# Patient Record
Sex: MALE | Race: OTHER | HISPANIC OR LATINO | URBAN - METROPOLITAN AREA
[De-identification: names, ages, dates, MRNs, and addresses within clinical notes are randomized per-mention and may not be internally consistent; named-entity substitution may affect disease eponyms.]

---

## 2017-06-26 ENCOUNTER — APPOINTMENT (RX ONLY)
Dept: URBAN - METROPOLITAN AREA CLINIC 349 | Facility: CLINIC | Age: 32
Setting detail: DERMATOLOGY
End: 2017-06-26

## 2017-06-26 DIAGNOSIS — L663 OTHER SPECIFIED DISEASES OF HAIR AND HAIR FOLLICLES: ICD-10-CM

## 2017-06-26 DIAGNOSIS — L73.9 FOLLICULAR DISORDER, UNSPECIFIED: ICD-10-CM

## 2017-06-26 DIAGNOSIS — L738 OTHER SPECIFIED DISEASES OF HAIR AND HAIR FOLLICLES: ICD-10-CM

## 2017-06-26 PROBLEM — L02.821 FURUNCLE OF HEAD [ANY PART, EXCEPT FACE]: Status: ACTIVE | Noted: 2017-06-26

## 2017-06-26 PROCEDURE — ? COUNSELING

## 2017-06-26 PROCEDURE — ? PRESCRIPTION

## 2017-06-26 PROCEDURE — 99213 OFFICE O/P EST LOW 20 MIN: CPT

## 2017-06-26 RX ORDER — CLINDAMYCIN PHOSPHATE 10 MG/ML
SOLUTION TOPICAL
Qty: 1 | Refills: 5 | Status: ERX | COMMUNITY
Start: 2017-06-26

## 2017-06-26 RX ORDER — SULFAMETHOXAZOLE AND TRIMETHOPRIM 800; 160 MG/1; MG/1
TABLET ORAL
Qty: 60 | Refills: 3 | Status: ERX | COMMUNITY
Start: 2017-06-26

## 2017-06-26 RX ADMIN — CLINDAMYCIN PHOSPHATE: 10 SOLUTION TOPICAL at 00:00

## 2017-06-26 RX ADMIN — SULFAMETHOXAZOLE AND TRIMETHOPRIM: 800; 160 TABLET ORAL at 00:00

## 2017-06-26 ASSESSMENT — LOCATION SIMPLE DESCRIPTION DERM
LOCATION SIMPLE: POSTERIOR SCALP
LOCATION SIMPLE: SCALP

## 2017-06-26 ASSESSMENT — LOCATION DETAILED DESCRIPTION DERM
LOCATION DETAILED: MID-OCCIPITAL SCALP
LOCATION DETAILED: LEFT SUPERIOR PARIETAL SCALP
LOCATION DETAILED: RIGHT INFERIOR OCCIPITAL SCALP
LOCATION DETAILED: RIGHT SUPERIOR PARIETAL SCALP

## 2017-06-26 ASSESSMENT — LOCATION ZONE DERM: LOCATION ZONE: SCALP

## 2017-10-03 ENCOUNTER — RX ONLY (OUTPATIENT)
Age: 32
Setting detail: RX ONLY
End: 2017-10-03

## 2017-10-03 RX ORDER — CLINDAMYCIN PHOSPHATE 10 MG/ML
SOLUTION TOPICAL
Qty: 1 | Refills: 5 | Status: ERX

## 2017-10-03 RX ORDER — SULFAMETHOXAZOLE AND TRIMETHOPRIM 800; 160 MG/1; MG/1
TABLET ORAL
Qty: 60 | Refills: 1 | Status: ERX

## 2017-11-10 ENCOUNTER — APPOINTMENT (RX ONLY)
Dept: URBAN - METROPOLITAN AREA CLINIC 349 | Facility: CLINIC | Age: 32
Setting detail: DERMATOLOGY
End: 2017-11-10

## 2017-11-10 DIAGNOSIS — L73.9 FOLLICULAR DISORDER, UNSPECIFIED: ICD-10-CM | Status: RESOLVING

## 2017-11-10 DIAGNOSIS — L738 OTHER SPECIFIED DISEASES OF HAIR AND HAIR FOLLICLES: ICD-10-CM | Status: RESOLVING

## 2017-11-10 DIAGNOSIS — L663 OTHER SPECIFIED DISEASES OF HAIR AND HAIR FOLLICLES: ICD-10-CM | Status: RESOLVING

## 2017-11-10 PROBLEM — L02.821 FURUNCLE OF HEAD [ANY PART, EXCEPT FACE]: Status: ACTIVE | Noted: 2017-11-10

## 2017-11-10 PROCEDURE — ? PRESCRIPTION

## 2017-11-10 PROCEDURE — ? TREATMENT REGIMEN

## 2017-11-10 PROCEDURE — ? COUNSELING

## 2017-11-10 PROCEDURE — 99213 OFFICE O/P EST LOW 20 MIN: CPT

## 2017-11-10 RX ORDER — SULFAMETHOXAZOLE AND TRIMETHOPRIM 800; 160 MG/1; MG/1
TABLET ORAL
Qty: 60 | Refills: 1 | Status: CANCELLED
Stop reason: CLARIF

## 2017-11-10 RX ORDER — SULFAMETHOXAZOLE AND TRIMETHOPRIM 800; 160 MG/1; MG/1
TABLET ORAL
Qty: 60 | Refills: 3 | Status: CANCELLED
Stop reason: CLARIF

## 2017-11-10 RX ORDER — CLINDAMYCIN PHOSPHATE 10 MG/ML
SOLUTION TOPICAL
Qty: 1 | Refills: 5 | Status: CANCELLED
Stop reason: CLARIF

## 2017-11-10 ASSESSMENT — LOCATION ZONE DERM: LOCATION ZONE: SCALP

## 2017-11-10 ASSESSMENT — LOCATION SIMPLE DESCRIPTION DERM
LOCATION SIMPLE: SCALP
LOCATION SIMPLE: POSTERIOR SCALP

## 2017-11-10 ASSESSMENT — LOCATION DETAILED DESCRIPTION DERM
LOCATION DETAILED: LEFT SUPERIOR PARIETAL SCALP
LOCATION DETAILED: MID-OCCIPITAL SCALP
LOCATION DETAILED: RIGHT SUPERIOR PARIETAL SCALP
LOCATION DETAILED: RIGHT INFERIOR OCCIPITAL SCALP

## 2018-05-24 ENCOUNTER — RX ONLY (OUTPATIENT)
Age: 33
Setting detail: RX ONLY
End: 2018-05-24

## 2018-05-24 ENCOUNTER — APPOINTMENT (RX ONLY)
Dept: URBAN - METROPOLITAN AREA CLINIC 349 | Facility: CLINIC | Age: 33
Setting detail: DERMATOLOGY
End: 2018-05-24

## 2018-05-24 DIAGNOSIS — L73.9 FOLLICULAR DISORDER, UNSPECIFIED: ICD-10-CM

## 2018-05-24 DIAGNOSIS — L738 OTHER SPECIFIED DISEASES OF HAIR AND HAIR FOLLICLES: ICD-10-CM

## 2018-05-24 DIAGNOSIS — L663 OTHER SPECIFIED DISEASES OF HAIR AND HAIR FOLLICLES: ICD-10-CM

## 2018-05-24 PROBLEM — L02.821 FURUNCLE OF HEAD [ANY PART, EXCEPT FACE]: Status: ACTIVE | Noted: 2018-05-24

## 2018-05-24 PROCEDURE — ? TREATMENT REGIMEN

## 2018-05-24 PROCEDURE — 99213 OFFICE O/P EST LOW 20 MIN: CPT

## 2018-05-24 PROCEDURE — ? COUNSELING

## 2018-05-24 PROCEDURE — ? PRESCRIPTION

## 2018-05-24 RX ORDER — CLOBETASOL PROPIONATE 0.46 MG/ML
SOLUTION TOPICAL
Qty: 1 | Refills: 2 | Status: ERX

## 2018-05-24 RX ORDER — CIPROFLOXACIN HYDROCHLORIDE 500 MG/1
TABLET, FILM COATED ORAL
Qty: 60 | Refills: 2 | Status: ERX | COMMUNITY
Start: 2018-05-24

## 2018-05-24 RX ORDER — CLOBETASOL PROPIONATE 0.46 MG/ML
SOLUTION TOPICAL
Qty: 1 | Refills: 2 | Status: ERX | COMMUNITY
Start: 2018-05-24

## 2018-05-24 RX ADMIN — CLOBETASOL PROPIONATE: 0.46 SOLUTION TOPICAL at 00:00

## 2018-05-24 RX ADMIN — CIPROFLOXACIN HYDROCHLORIDE: 500 TABLET, FILM COATED ORAL at 00:00

## 2018-05-24 ASSESSMENT — LOCATION DETAILED DESCRIPTION DERM
LOCATION DETAILED: LEFT SUPERIOR PARIETAL SCALP
LOCATION DETAILED: MID-OCCIPITAL SCALP
LOCATION DETAILED: LEFT SUPERIOR PARIETAL SCALP
LOCATION DETAILED: RIGHT INFERIOR OCCIPITAL SCALP
LOCATION DETAILED: RIGHT SUPERIOR PARIETAL SCALP

## 2018-05-24 ASSESSMENT — LOCATION SIMPLE DESCRIPTION DERM
LOCATION SIMPLE: SCALP
LOCATION SIMPLE: POSTERIOR SCALP
LOCATION SIMPLE: SCALP

## 2018-05-24 ASSESSMENT — LOCATION ZONE DERM
LOCATION ZONE: SCALP
LOCATION ZONE: SCALP

## 2018-05-24 NOTE — HPI: INFECTION (FOLLICULITIS)
How Severe Is It?: severe
Is This A New Presentation, Or A Follow-Up?: Follow Up Folliculitis
Additional History: Recurring

## 2018-06-25 ENCOUNTER — RX ONLY (OUTPATIENT)
Age: 33
Setting detail: RX ONLY
End: 2018-06-25

## 2018-06-25 RX ORDER — SULFAMETHOXAZOLE AND TRIMETHOPRIM 800; 160 MG/1; MG/1
TABLET ORAL
Qty: 60 | Refills: 2 | Status: ERX

## 2019-02-12 ENCOUNTER — APPOINTMENT (RX ONLY)
Dept: URBAN - METROPOLITAN AREA CLINIC 349 | Facility: CLINIC | Age: 34
Setting detail: DERMATOLOGY
End: 2019-02-12

## 2019-02-12 DIAGNOSIS — Z41.9 ENCOUNTER FOR PROCEDURE FOR PURPOSES OTHER THAN REMEDYING HEALTH STATE, UNSPECIFIED: ICD-10-CM

## 2019-02-12 PROCEDURE — ? COSMETIC CONSULTATION: SKIN CARE PRODUCTS AND SERVICES

## 2019-02-12 ASSESSMENT — LOCATION SIMPLE DESCRIPTION DERM: LOCATION SIMPLE: LEFT CHEEK

## 2019-02-12 ASSESSMENT — LOCATION ZONE DERM: LOCATION ZONE: FACE

## 2019-02-12 ASSESSMENT — LOCATION DETAILED DESCRIPTION DERM: LOCATION DETAILED: LEFT CENTRAL MALAR CHEEK

## 2019-03-27 ENCOUNTER — RX ONLY (OUTPATIENT)
Age: 34
Setting detail: RX ONLY
End: 2019-03-27

## 2019-03-27 ENCOUNTER — APPOINTMENT (RX ONLY)
Dept: URBAN - METROPOLITAN AREA CLINIC 349 | Facility: CLINIC | Age: 34
Setting detail: DERMATOLOGY
End: 2019-03-27

## 2019-03-27 DIAGNOSIS — Z41.9 ENCOUNTER FOR PROCEDURE FOR PURPOSES OTHER THAN REMEDYING HEALTH STATE, UNSPECIFIED: ICD-10-CM

## 2019-03-27 PROCEDURE — ? OTHER (COSMETIC)

## 2019-03-27 RX ORDER — TRETINOIN 0.5 MG/G
CREAM TOPICAL
Qty: 1 | Refills: 6 | Status: ERX | COMMUNITY
Start: 2019-03-27

## 2019-03-27 ASSESSMENT — LOCATION DETAILED DESCRIPTION DERM
LOCATION DETAILED: LEFT CENTRAL MALAR CHEEK
LOCATION DETAILED: LEFT MEDIAL MALAR CHEEK
LOCATION DETAILED: RIGHT MEDIAL MALAR CHEEK
LOCATION DETAILED: RIGHT SUPERIOR MEDIAL MALAR CHEEK
LOCATION DETAILED: LEFT INFERIOR CENTRAL MALAR CHEEK
LOCATION DETAILED: LEFT MEDIAL ZYGOMA
LOCATION DETAILED: RIGHT CENTRAL MALAR CHEEK
LOCATION DETAILED: RIGHT SUPERIOR CENTRAL MALAR CHEEK
LOCATION DETAILED: RIGHT INFERIOR CENTRAL MALAR CHEEK
LOCATION DETAILED: LEFT SUPERIOR CENTRAL MALAR CHEEK
LOCATION DETAILED: LEFT INFERIOR MEDIAL MALAR CHEEK

## 2019-03-27 ASSESSMENT — LOCATION ZONE DERM: LOCATION ZONE: FACE

## 2019-03-27 ASSESSMENT — LOCATION SIMPLE DESCRIPTION DERM
LOCATION SIMPLE: LEFT ZYGOMA
LOCATION SIMPLE: RIGHT CHEEK
LOCATION SIMPLE: LEFT CHEEK

## 2019-03-27 NOTE — PROCEDURE: OTHER (COSMETIC)
Detail Level: Zone
Other (Free Text): Subcision
Price (Use Numbers Only, No Special Characters Or $): 100.00

## 2019-08-22 ENCOUNTER — APPOINTMENT (OUTPATIENT)
Dept: CT IMAGING | Age: 34
End: 2019-08-22
Attending: EMERGENCY MEDICINE
Payer: SELF-PAY

## 2019-08-22 ENCOUNTER — HOSPITAL ENCOUNTER (EMERGENCY)
Age: 34
Discharge: HOME | End: 2019-08-22
Attending: EMERGENCY MEDICINE
Payer: SELF-PAY

## 2019-08-22 VITALS
HEART RATE: 85 BPM | DIASTOLIC BLOOD PRESSURE: 89 MMHG | TEMPERATURE: 98.5 F | RESPIRATION RATE: 22 BRPM | OXYGEN SATURATION: 95 % | HEIGHT: 66 IN | BODY MASS INDEX: 22.5 KG/M2 | WEIGHT: 140 LBS | SYSTOLIC BLOOD PRESSURE: 145 MMHG

## 2019-08-22 DIAGNOSIS — H83.02 LABYRINTHITIS OF LEFT EAR: Primary | ICD-10-CM

## 2019-08-22 DIAGNOSIS — R73.9 HYPERGLYCEMIA: ICD-10-CM

## 2019-08-22 DIAGNOSIS — H61.22 IMPACTED CERUMEN OF LEFT EAR: ICD-10-CM

## 2019-08-22 LAB
ALBUMIN SERPL-MCNC: 4 G/DL (ref 3.5–5)
ALBUMIN/GLOB SERPL: 0.9 {RATIO} (ref 1.2–3.5)
ALP SERPL-CCNC: 90 U/L (ref 50–136)
ALT SERPL-CCNC: 42 U/L (ref 12–65)
ANION GAP SERPL CALC-SCNC: 6 MMOL/L (ref 7–16)
AST SERPL-CCNC: 20 U/L (ref 15–37)
BASOPHILS # BLD: 0 K/UL (ref 0–0.2)
BASOPHILS NFR BLD: 0 % (ref 0–2)
BILIRUB SERPL-MCNC: 0.7 MG/DL (ref 0.2–1.1)
BUN SERPL-MCNC: 8 MG/DL (ref 6–23)
CALCIUM SERPL-MCNC: 8.9 MG/DL (ref 8.3–10.4)
CHLORIDE SERPL-SCNC: 103 MMOL/L (ref 98–107)
CK SERPL-CCNC: 147 U/L (ref 21–215)
CO2 SERPL-SCNC: 26 MMOL/L (ref 21–32)
CREAT SERPL-MCNC: 0.75 MG/DL (ref 0.8–1.5)
DIFFERENTIAL METHOD BLD: ABNORMAL
EOSINOPHIL # BLD: 0 K/UL (ref 0–0.8)
EOSINOPHIL NFR BLD: 0 % (ref 0.5–7.8)
ERYTHROCYTE [DISTWIDTH] IN BLOOD BY AUTOMATED COUNT: 11.8 % (ref 11.9–14.6)
GLOBULIN SER CALC-MCNC: 4.3 G/DL (ref 2.3–3.5)
GLUCOSE SERPL-MCNC: 247 MG/DL (ref 65–100)
HCT VFR BLD AUTO: 42.6 % (ref 41.1–50.3)
HGB BLD-MCNC: 14.9 G/DL (ref 13.6–17.2)
IMM GRANULOCYTES # BLD AUTO: 0 K/UL (ref 0–0.5)
IMM GRANULOCYTES NFR BLD AUTO: 0 % (ref 0–5)
LYMPHOCYTES # BLD: 1.9 K/UL (ref 0.5–4.6)
LYMPHOCYTES NFR BLD: 28 % (ref 13–44)
MAGNESIUM SERPL-MCNC: 2 MG/DL (ref 1.8–2.4)
MCH RBC QN AUTO: 29.9 PG (ref 26.1–32.9)
MCHC RBC AUTO-ENTMCNC: 35 G/DL (ref 31.4–35)
MCV RBC AUTO: 85.4 FL (ref 79.6–97.8)
MONOCYTES # BLD: 0.5 K/UL (ref 0.1–1.3)
MONOCYTES NFR BLD: 7 % (ref 4–12)
NEUTS SEG # BLD: 4.2 K/UL (ref 1.7–8.2)
NEUTS SEG NFR BLD: 64 % (ref 43–78)
NRBC # BLD: 0 K/UL (ref 0–0.2)
PLATELET # BLD AUTO: 245 K/UL (ref 150–450)
PMV BLD AUTO: 10.9 FL (ref 9.4–12.3)
POTASSIUM SERPL-SCNC: 3.6 MMOL/L (ref 3.5–5.1)
PROT SERPL-MCNC: 8.3 G/DL (ref 6.3–8.2)
RBC # BLD AUTO: 4.99 M/UL (ref 4.23–5.6)
SODIUM SERPL-SCNC: 135 MMOL/L (ref 136–145)
WBC # BLD AUTO: 6.6 K/UL (ref 4.3–11.1)

## 2019-08-22 PROCEDURE — 83735 ASSAY OF MAGNESIUM: CPT

## 2019-08-22 PROCEDURE — 70450 CT HEAD/BRAIN W/O DYE: CPT

## 2019-08-22 PROCEDURE — 76010010392 HC REMOVAL IMPACTED WAX IRRIGATION/LVG UNI: Performed by: EMERGENCY MEDICINE

## 2019-08-22 PROCEDURE — 85025 COMPLETE CBC W/AUTO DIFF WBC: CPT

## 2019-08-22 PROCEDURE — 74011250637 HC RX REV CODE- 250/637: Performed by: EMERGENCY MEDICINE

## 2019-08-22 PROCEDURE — 74011250636 HC RX REV CODE- 250/636: Performed by: EMERGENCY MEDICINE

## 2019-08-22 PROCEDURE — 82550 ASSAY OF CK (CPK): CPT

## 2019-08-22 PROCEDURE — 80053 COMPREHEN METABOLIC PANEL: CPT

## 2019-08-22 PROCEDURE — 96374 THER/PROPH/DIAG INJ IV PUSH: CPT | Performed by: EMERGENCY MEDICINE

## 2019-08-22 PROCEDURE — 99284 EMERGENCY DEPT VISIT MOD MDM: CPT | Performed by: EMERGENCY MEDICINE

## 2019-08-22 RX ORDER — SODIUM CHLORIDE 9 MG/ML
1000 INJECTION, SOLUTION INTRAVENOUS ONCE
Status: COMPLETED | OUTPATIENT
Start: 2019-08-22 | End: 2019-08-22

## 2019-08-22 RX ORDER — PROCHLORPERAZINE EDISYLATE 5 MG/ML
10 INJECTION INTRAMUSCULAR; INTRAVENOUS
Status: COMPLETED | OUTPATIENT
Start: 2019-08-22 | End: 2019-08-22

## 2019-08-22 RX ORDER — ACETAMINOPHEN 500 MG
1000 TABLET ORAL
Status: COMPLETED | OUTPATIENT
Start: 2019-08-22 | End: 2019-08-22

## 2019-08-22 RX ORDER — MECLIZINE HYDROCHLORIDE 25 MG/1
25 TABLET ORAL
Qty: 30 TAB | Refills: 0 | Status: SHIPPED | OUTPATIENT
Start: 2019-08-22

## 2019-08-22 RX ORDER — MECLIZINE HYDROCHLORIDE 25 MG/1
25 TABLET ORAL
Status: COMPLETED | OUTPATIENT
Start: 2019-08-22 | End: 2019-08-22

## 2019-08-22 RX ADMIN — ACETAMINOPHEN 1000 MG: 500 TABLET, FILM COATED ORAL at 10:14

## 2019-08-22 RX ADMIN — PROCHLORPERAZINE EDISYLATE 10 MG: 5 INJECTION INTRAMUSCULAR; INTRAVENOUS at 10:14

## 2019-08-22 RX ADMIN — MECLIZINE HYDROCHLORIDE 25 MG: 25 TABLET ORAL at 10:14

## 2019-08-22 RX ADMIN — SODIUM CHLORIDE 1000 ML: 900 INJECTION, SOLUTION INTRAVENOUS at 10:14

## 2019-08-22 NOTE — ED NOTES
Large ball of hard wax flushed from left ear. I have reviewed discharge instructions with the patient. The patient verbalized understanding. Patient left ED via Discharge Method: ambulatory to Home with wife. Opportunity for questions and clarification provided. Patient given 1 scripts. To continue your aftercare when you leave the hospital, you may receive an automated call from our care team to check in on how you are doing. This is a free service and part of our promise to provide the best care and service to meet your aftercare needs.  If you have questions, or wish to unsubscribe from this service please call [phone]. Thank you for Choosing our [facility] Emergency Department.

## 2019-08-22 NOTE — DISCHARGE INSTRUCTIONS
Patient Education        Laberintitis: Maru Morgan - [ Labyrinthitis: Care Instructions ]  Instrucciones de cuidado    La laberintitis es un problema en la parte profunda del oído interno. Ocurre cuando el laberinto se inflama. Esta es la parte del oído interno que ayuda a controlar el equilibrio. El problema puede causar vértigo. El vértigo hace que usted sienta parag si estuviera dando vueltas o giros. Es posible que sienta revoltura estomacal o que vomite. Podría perder la audición por un tiempo. O quizás tenga un zumbido [facility]. [address], la laberintitis se va edinson. Con frecuencia esto lleva varias semanas. Si el problema está causado por bacterias, roque médico le recetará antibióticos. Zeny la mayoría de los casos están causados por un virus. Los virus no se pueden curar con antibióticos. Es posible que roque médico le recete medicamentos para ayudar a controlar las náuseas y el vómito. La atención de seguimiento es herbert parte clave de roque tratamiento y seguridad. Asegúrese de hacer y acudir a todas las citas, y llame a roque médico si está teniendo problemas. También es herbert buena idea saber los resultados de pau exámenes y mantener herbert lista de los medicamentos que ross. ¿Cómo puede cuidarse en el hogar? · Pruebe a reposar en cama y a mantener la na Piter Schw[employer] primeros huyen que tenga vértigo. [place] puede ayudar con [place] y a reducir las náuseas y el vómito. · Vuelva a pau actividades normales si el vértigo dura más de unos pocos días. Coffeen puede ser difícil zeny, por lo general, ayuda a que roque cerebro se adapte al vértigo con Cecillia Nip. A medida que roqeu cerebro se adapte, el vértigo desaparecerá poco a poco.  · Demond lo que pueda para prevenir caídas. Por ejemplo, mantenga roque hogar ordenado y use tapetes antideslizantes en roque casa en general y en roque baño en particular. El vértigo hace que tenga herbert mayor probabilidad de caerse.   · Pruebe a hacer ejercicios de equilibrio para el vértigo si roque médico lo sugiere. Un ejemplo es pararse con los pies juntos, los brazos a los costados. Mantenga esta posición josé miguel 30 segundos. · Demond el ejercicio de [place] si roque médico lo sugiere. Puede ayudar a que roque cerebro se adapte al vértigo. ? Siéntese en el borde de roque cama o de un sofá. ? Acuéstese rápidamente de costado. ? Quédese en sulma posición hasta que pase el vértigo o josé miguel por lo menos 30 segundos. ? Siéntese. Si esto le causa vértigo, espere a que pase. ? Demond willa ejercicio del otro lado. ? Repita estos pasos 10 veces. Practique willa ejercicio 2 veces al día hasta que desaparezca la sensación de vértigo. · [place] pau medicamentos exactamente parag le fueron recetados. Llame a roque médico si prisca estar teniendo un problema con pau medicamentos. · Si roque médico le recetó antibióticos, tómelos según las indicaciones. No deje de tomarlos por el hecho de sentirse mejor. Debe janusz todos los antibióticos hasta terminarlos. ¿Cuándo debe pedir ayuda? Llame al 911 en cualquier momento que considere que necesita atención de [place]. Por ejemplo, llame si:    · Se desmayó (perdió el conocimiento).     · Tiene síntomas de un ataque cerebral. Estos pueden incluir:  ? Entumecimiento, hormigueo, debilidad o parálisis repentinos en la claudia, el brazo o la pierna, sobre todo si ocurre en un solo lado del cuerpo. ? Cambios súbitos en la vista. ? Problemas repentinos para hablar. ? Confusión súbita o dificultad repentina para comprender frases sencillas. ? Problemas repentinos para caminar o mantener el equilibrio. ?  Un dolor de na intenso y repentino, distinto a los mo de na anteriores.    Llame a roque médico ahora mismo o busque atención médica inmediata si:    · Tiene mareos nuevos o peores.     · Nota cambios en la audición.    Preste especial atención a los cambios en roque elijah y asegúrese de comunicarse con roque médico si:    · Tiene náuseas o vómito nuevos o peores.     · El vértigo empeora.     · El vértigo no ha florinda en 2 semanas. ¿Dónde puede encontrar más información en inglés? Adan Pavon a [email or web address]-boby.info/. Martha [id number] en la búsqueda para aprender más acerca de \"Laberintitis: Instrucciones de cuidado - [ Labyrinthitis: Care Instructions ]. \"  Revisado: 21 octubre, 2018  Versión del contenido: 12.1  © 5235-7211 Healthwise, Incorporated. Las instrucciones de cuidado fueron adaptadas bajo licencia por [employer] (which disclaims liability or warranty for this information). Si usted tiene [place] afección médica o sobre estas instrucciones, siempre pregunte a roque profesional de elijah. Healthwise, Incorporated niega toda garantía o responsabilidad por roque uso de esta información. Patient Education        Diefrandy Encarnacion cerumen: Instrucciones de cuidado - [ Earwax Blockage: Care Instructions ]  Instrucciones de cuidado    El cerumen es herbert sustancia natural que protege el canal [place]. En general, el cerumen drena de los oídos y no causa problemas. A veces, se acumula y se endurece. El tapón de cerumen (también llamado impactación de cerumen) puede causar algo de pérdida de la audición y dolor. Cuando el cerumen está muy compactado necesitará que un médico lo extraiga. La atención de seguimiento es herbert parte clave de roque tratamiento y seguridad. Asegúrese de hacer y acudir a todas las citas, y llame a roque médico si está teniendo problemas. También es herbert buena idea saber los resultados de pau exámenes y mantener herbert lista de los medicamentos que ross. ¿Cómo puede cuidarse en el hogar? · No trate de extraer el cerumen con hisopos de algodón, con los dedos o con otros objetos. [place] puede empeorar la obstrucción y dañar el tímpano. · Si roque médico le recomienda que trate de extraerse el cerumen en casa:  ? [place] y afloje el cerumen con aceite mineral tibio.  También puede tratar de usar peróxido de hidrógeno (agua oxigenada) mezclado con herbert cantidad igual de agua a temperatura ambiente. Ponga en el oído 2 gotas del líquido calentado a la temperatura del cuerpo, 2 veces al día por un rudi de 5 huyen. ? Tommy Sartorius que la cera está suelta y [place], por lo general todo lo que se necesita para sacarla del conducto auditivo externo es herbert Dairl Proper y tibia. Dirija el agua hacia el oído y luego incline la na para permitir que drene el cerumen. Seque kwame el oído con un secador de pelo a baja temperatura. Sostenga el secador a varias pulgadas del oído. ? Si el aceite mineral tibio y la ducha no funcionan, utilice un ablandador de cera de venta kris seguido por un lavado suave con [place] de oído todas las noches josé miguel herbert o René. Asegúrese de que la solución de lavado esté a la temperatura corporal. Los líquidos fríos o calientes en el oído pueden ocasionar [employer]. ¿Cuándo debe pedir ayuda? Llame a roque médico ahora mismo o busque atención médica inmediata si:    · Le sale pus o judy del oído.     · Tiene un zumbido en el oído o lo siente lleno.     · Tiene pérdida de la audición.    Preste especial atención a los cambios en roque elijah y asegúrese de comunicarse con roque médico si:    · Tiene dolor o se le ha reducido la audición después de 1 semana de tratamiento casero.     · Tiene algún síntoma nuevo, parag náuseas o problemas de equilibrio. ¿Dónde puede encontrar más información en inglés? Al Parks a http://marta-boby.info/. Igor Soares [id number] en la búsqueda para aprender más acerca de \"Tapón de cerumen: Instrucciones de cuidado - [ Earwax Blockage: Care Instructions ]. \"  Revisado: 23 septiembre, 2018  Versión del contenido: 12.1  © 6555-9115 Healthwise, [employer]. Las instrucciones de cuidado fueron adaptadas bajo licencia por [employer] (which disclaims liability or warranty for this information).  Si usted tiene [place] afección médica o sobre estas instrucciones, siempre pregunte a roque profesional de elijah. [facility] niega toda garantía o responsabilidad por roque uso de esta información. Patient Education        Theoplis Silk el azúcar andriy en la judy - [ Learning About High Blood Sugar ]  ¿Qué es el azúcar andriy en la judy? El cuerpo [employer] alimentos que usted come en glucosa (azúcar), la cual [place] para obtener energía. Zeny si roque cuerpo es incapaz de utilizar el azúcar de inmediato, puede acumularse en la judy y provocar un nivel alto de azúcar en la judy. Cuando la cantidad de azúcar en la judy permanece muy andriy por demasiado tiempo, usted puede tener diabetes. La diabetes es Aydee Alley enfermedad que puede causar problemas graves de [place]. Lo lewis es que hacer cambios en roque estilo de polina puede ayudarle a hacer que el azúcar en la judy vuelva a un nivel normal y ayudarle a evitar o retrasar la diabetes. ¿Cuál es la causa del azúcar andriy en la judy? El azúcar (glucosa) puede acumularse en la judy si usted:  · Tiene sobrepeso. · Tiene antecedentes familiares de diabetes. · Jeanette ciertos medicamentos, parag esteroides. ¿Cuáles son los síntomas? Tener azúcar andriy en la judy puede no causar ningún síntoma. O puede hacerle sentir mucha sed o mucha hambre. También puede orinar con más frecuencia de lo normal, tener visión borrosa o perder peso sin intentarlo. ¿Cómo se trata el azúcar andriy en la judy? Usted puede janusz medidas para reducir roque nivel de azúcar en la judy si entiende lo que lo eleva. Roque médico puede desear que usted aprenda a revisarse el nivel de azúcar en la judy en casa. Entonces puede chanda cómo la enfermedad, el estrés o diferentes tipos de alimentos o medicamentos aumentan o disminuyen roque nivel de azúcar en la judy. Pueden ser necesarias otras pruebas para chanda si tiene diabetes. ¿Cómo se puede prevenir el azúcar andriy en la judy? · Controle roque peso.  Si tiene sobrepeso, bajar solo un poco de [place] ayudar. Reducir la grasa alrededor de roque cintura es lo más importante. · Limite la cantidad de calorías, dulces y grasas poco saludables que come. Pregúntele a roque médico si un dietista puede ayudarle. Un dietista registrado puede ayudarle a elaborar planes de alimentación que se adapten a roque estilo de polina. · Demond al menos 30 minutos de ejercicio la mayoría de los días de la [place]. El ejercicio ayuda a controlar el azúcar en la judy. También ayuda a mantener un peso saludable. Caminar es herbert buena opción. Es posible que también quiera hacer otras actividades, parag correr, nadar, [employer], o jugar tenis o deportes de equipo. · Si roque médico le recetó medicamentos, tómelos exactamente parag se le indicó. Llame a roque médico si prisca que está teniendo un problema con roque medicamento. Recibirá [employer] medicamentos específicos recetados por roque médico.  La atención de seguimiento es herbert parte clave de roque tratamiento y seguridad. Asegúrese de hacer y acudir a todas las citas, y llame a roque médico si está teniendo problemas. También es herbert buena idea saber los resultados de pau exámenes y mantener herbert lista de los medicamentos que ross. ¿Dónde puede encontrar más información en inglés? Abraham Flores a http://marta-boby.info/. Escriba O108 en la búsqueda para aprender más acerca de \"Aprenda sobre el azúcar andriy en la judy - [ Learning About High Blood Sugar ]. \"  Revisado: 25 julio, 2018  Versión del contenido: 12.1  © 0143-3296 Healthwise, Incorporated. Las instrucciones de cuidado fueron adaptadas bajo licencia por [employer] (which disclaims liability or warranty for this information). Si usted tiene [place] afección médica o sobre estas instrucciones, siempre pregunte a roque profesional de elijah. [facility] Incorporated niega toda garantía o responsabilidad por roque uso de esta información.

## 2019-08-22 NOTE — ED PROVIDER NOTES
Patient presents with complaints of dizziness and headache. He states his symptoms began yesterday with a dull occipital headache that is persistent at this time. Despite apparently rating his pain is a 10/10 in intensity he reports the pain descriptively as mild. Symptoms began with a headache and then progressed to dizziness which she describes as being off balance and motion within the visual fields. This provokes some nausea and vomiting. Nausea and vomiting were primarily last night although he still feels somewhat nauseated now. He denies any generalized myalgias. The history is provided by the patient and a significant other. The history is limited by a language barrier. A  was used ( acting as  initially). Dizziness   This is a new problem. The current episode started yesterday. The problem has not changed since onset. There was no focality noted. Primary symptoms include loss of balance. Pertinent negatives include no focal weakness, no loss of sensation, no slurred speech, no speech difficulty, no memory loss, no movement disorder, no agitation, no visual change, no auditory change, no mental status change, no unresponsiveness and no disorientation. There has been no fever. Associated symptoms include vomiting, headaches and nausea. Pertinent negatives include no shortness of breath, no chest pain, no altered mental status, no confusion, no choking, no bowel incontinence and no bladder incontinence. There were no medications administered prior to arrival.        Past Medical History:   Diagnosis Date    Diabetes ([address]     \"boarderline\"       History reviewed. No pertinent surgical history. History reviewed. No pertinent family history.     Social History     Socioeconomic History    Marital status: Not on file     Spouse name: Not on file    Number of children: Not on file    Years of education: Not on file    Highest education level: Not on file Occupational History    Not on file   Social Needs    Financial resource strain: Not on file    Food insecurity:     Worry: Not on file     Inability: Not on file    Transportation needs:     Medical: Not on file     Non-medical: Not on file   Tobacco Use    Smoking status: Never Smoker    Smokeless tobacco: Never Used   Substance and Sexual Activity    Alcohol use: Yes     Comment: occ.  Drug use: Never    Sexual activity: Not on file   Lifestyle    Physical activity:     Days per week: Not on file     Minutes per session: Not on file    Stress: Not on file   Relationships    Social connections:     Talks on phone: Not on file     Gets together: Not on file     Attends Pentecostalism service: Not on file     Active member of club or organization: Not on file     Attends meetings of clubs or organizations: Not on file     Relationship status: Not on file    Intimate partner violence:     Fear of current or ex partner: Not on file     Emotionally abused: Not on file     Physically abused: Not on file     Forced sexual activity: Not on file   Other Topics Concern    Not on file   Social History Narrative    Not on file         ALLERGIES: Patient has no known allergies. Review of Systems   Respiratory: Negative for choking and shortness of breath. Cardiovascular: Negative for chest pain. Gastrointestinal: Positive for nausea and vomiting. Negative for bowel incontinence. Genitourinary: Negative for bladder incontinence. Neurological: Positive for dizziness, headaches and loss of balance. Negative for focal weakness and speech difficulty. Psychiatric/Behavioral: Negative for agitation, confusion and memory loss. All other systems reviewed and are negative.       Vitals:    08/22/19 0952   BP: 137/87   Pulse: 88   Resp: 16   Temp: 98.5 °F (36.9 °C)   SpO2: 99%   Weight: 63.5 kg (140 lb)   Height: 5' 6\" (1.676 m)            Physical Exam   Constitutional: He is oriented to person, place, and time. He appears well-developed and well-nourished. No distress. HENT:   Head: Normocephalic and atraumatic. Right Ear: External ear normal.   Left external auditory canal is obscured with a cerumen impaction;  Mucous membrane's are dry   Eyes: Pupils are equal, round, and reactive to light. Conjunctivae and EOM are normal.   Positive nystagmus noted with fast component to the left   Neck: Normal range of motion. Neck supple. Cardiovascular: Normal rate and regular rhythm. Pulmonary/Chest: Effort normal and breath sounds normal.   Abdominal: Soft. Bowel sounds are normal.   Neurological: He is alert and oriented to person, place, and time. Skin: Skin is warm and dry. Capillary refill takes less than 2 seconds. He is not diaphoretic. Psychiatric: He has a normal mood and affect. His behavior is normal.   Nursing note and vitals reviewed.        MDM  Number of Diagnoses or Management Options     Amount and/or Complexity of Data Reviewed  Clinical lab tests: ordered and reviewed  Tests in the radiology section of CPT®: ordered and reviewed  Independent visualization of images, tracings, or specimens: yes    Risk of Complications, Morbidity, and/or Mortality  Presenting problems: moderate  Diagnostic procedures: moderate  Management options: moderate    Patient Progress  Patient progress: stable         Procedures